# Patient Record
Sex: MALE | Race: WHITE | NOT HISPANIC OR LATINO | Employment: FULL TIME | URBAN - METROPOLITAN AREA
[De-identification: names, ages, dates, MRNs, and addresses within clinical notes are randomized per-mention and may not be internally consistent; named-entity substitution may affect disease eponyms.]

---

## 2024-05-19 ENCOUNTER — APPOINTMENT (OUTPATIENT)
Dept: CT IMAGING | Facility: CLINIC | Age: 54
End: 2024-05-19
Attending: EMERGENCY MEDICINE
Payer: COMMERCIAL

## 2024-05-19 ENCOUNTER — HOSPITAL ENCOUNTER (EMERGENCY)
Facility: CLINIC | Age: 54
Discharge: HOME | End: 2024-05-19
Attending: EMERGENCY MEDICINE | Admitting: EMERGENCY MEDICINE
Payer: COMMERCIAL

## 2024-05-19 VITALS
RESPIRATION RATE: 20 BRPM | DIASTOLIC BLOOD PRESSURE: 98 MMHG | SYSTOLIC BLOOD PRESSURE: 137 MMHG | HEART RATE: 92 BPM | WEIGHT: 225 LBS | OXYGEN SATURATION: 98 % | TEMPERATURE: 97.9 F

## 2024-05-19 DIAGNOSIS — N20.0 KIDNEY STONE: ICD-10-CM

## 2024-05-19 LAB
ALBUMIN SERPL BCG-MCNC: 4.4 G/DL (ref 3.5–5.2)
ALBUMIN UR-MCNC: NEGATIVE MG/DL
ALP SERPL-CCNC: 75 U/L (ref 40–150)
ALT SERPL W P-5'-P-CCNC: 14 U/L (ref 0–70)
ANION GAP SERPL CALCULATED.3IONS-SCNC: 15 MMOL/L (ref 7–15)
APPEARANCE UR: CLEAR
AST SERPL W P-5'-P-CCNC: 22 U/L (ref 0–45)
BASOPHILS # BLD AUTO: 0.1 10E3/UL (ref 0–0.2)
BASOPHILS NFR BLD AUTO: 0 %
BILIRUB DIRECT SERPL-MCNC: <0.2 MG/DL (ref 0–0.3)
BILIRUB SERPL-MCNC: 0.3 MG/DL
BILIRUB UR QL STRIP: NEGATIVE
BUN SERPL-MCNC: 15.2 MG/DL (ref 6–20)
CALCIUM SERPL-MCNC: 9.4 MG/DL (ref 8.6–10)
CHLORIDE SERPL-SCNC: 100 MMOL/L (ref 98–107)
COLOR UR AUTO: ABNORMAL
CREAT SERPL-MCNC: 0.92 MG/DL (ref 0.67–1.17)
DEPRECATED HCO3 PLAS-SCNC: 23 MMOL/L (ref 22–29)
EGFRCR SERPLBLD CKD-EPI 2021: >90 ML/MIN/1.73M2
EOSINOPHIL # BLD AUTO: 0.2 10E3/UL (ref 0–0.7)
EOSINOPHIL NFR BLD AUTO: 1 %
ERYTHROCYTE [DISTWIDTH] IN BLOOD BY AUTOMATED COUNT: 11.9 % (ref 10–15)
GLUCOSE SERPL-MCNC: 167 MG/DL (ref 70–99)
GLUCOSE UR STRIP-MCNC: NEGATIVE MG/DL
HCT VFR BLD AUTO: 47.2 % (ref 40–53)
HGB BLD-MCNC: 15.5 G/DL (ref 13.3–17.7)
HGB UR QL STRIP: ABNORMAL
HOLD SPECIMEN: NORMAL
HOLD SPECIMEN: NORMAL
HYALINE CASTS: 1 /LPF
IMM GRANULOCYTES # BLD: 0.1 10E3/UL
IMM GRANULOCYTES NFR BLD: 1 %
KETONES UR STRIP-MCNC: 40 MG/DL
LEUKOCYTE ESTERASE UR QL STRIP: NEGATIVE
LIPASE SERPL-CCNC: 24 U/L (ref 13–60)
LYMPHOCYTES # BLD AUTO: 2.9 10E3/UL (ref 0.8–5.3)
LYMPHOCYTES NFR BLD AUTO: 14 %
MCH RBC QN AUTO: 30.9 PG (ref 26.5–33)
MCHC RBC AUTO-ENTMCNC: 32.8 G/DL (ref 31.5–36.5)
MCV RBC AUTO: 94 FL (ref 78–100)
MONOCYTES # BLD AUTO: 1 10E3/UL (ref 0–1.3)
MONOCYTES NFR BLD AUTO: 5 %
MUCOUS THREADS #/AREA URNS LPF: PRESENT /LPF
NEUTROPHILS # BLD AUTO: 15.9 10E3/UL (ref 1.6–8.3)
NEUTROPHILS NFR BLD AUTO: 79 %
NITRATE UR QL: NEGATIVE
NRBC # BLD AUTO: 0 10E3/UL
NRBC BLD AUTO-RTO: 0 /100
PH UR STRIP: 5.5 [PH] (ref 5–7)
PLATELET # BLD AUTO: 348 10E3/UL (ref 150–450)
POTASSIUM SERPL-SCNC: 4 MMOL/L (ref 3.4–5.3)
PROT SERPL-MCNC: 7.9 G/DL (ref 6.4–8.3)
RBC # BLD AUTO: 5.02 10E6/UL (ref 4.4–5.9)
RBC URINE: 16 /HPF
SODIUM SERPL-SCNC: 138 MMOL/L (ref 135–145)
SP GR UR STRIP: 1.02 (ref 1–1.03)
UROBILINOGEN UR STRIP-MCNC: NORMAL MG/DL
WBC # BLD AUTO: 20.1 10E3/UL (ref 4–11)
WBC URINE: 1 /HPF

## 2024-05-19 PROCEDURE — 36415 COLL VENOUS BLD VENIPUNCTURE: CPT | Performed by: EMERGENCY MEDICINE

## 2024-05-19 PROCEDURE — 82248 BILIRUBIN DIRECT: CPT | Performed by: EMERGENCY MEDICINE

## 2024-05-19 PROCEDURE — 81001 URINALYSIS AUTO W/SCOPE: CPT | Performed by: EMERGENCY MEDICINE

## 2024-05-19 PROCEDURE — 74176 CT ABD & PELVIS W/O CONTRAST: CPT

## 2024-05-19 PROCEDURE — 96361 HYDRATE IV INFUSION ADD-ON: CPT

## 2024-05-19 PROCEDURE — 85025 COMPLETE CBC W/AUTO DIFF WBC: CPT | Performed by: EMERGENCY MEDICINE

## 2024-05-19 PROCEDURE — 258N000003 HC RX IP 258 OP 636: Performed by: EMERGENCY MEDICINE

## 2024-05-19 PROCEDURE — 96374 THER/PROPH/DIAG INJ IV PUSH: CPT

## 2024-05-19 PROCEDURE — 250N000011 HC RX IP 250 OP 636: Performed by: EMERGENCY MEDICINE

## 2024-05-19 PROCEDURE — 83690 ASSAY OF LIPASE: CPT | Performed by: EMERGENCY MEDICINE

## 2024-05-19 PROCEDURE — 80048 BASIC METABOLIC PNL TOTAL CA: CPT | Performed by: EMERGENCY MEDICINE

## 2024-05-19 PROCEDURE — 80053 COMPREHEN METABOLIC PANEL: CPT | Performed by: EMERGENCY MEDICINE

## 2024-05-19 PROCEDURE — 99285 EMERGENCY DEPT VISIT HI MDM: CPT | Mod: 25

## 2024-05-19 RX ORDER — KETOROLAC TROMETHAMINE 15 MG/ML
15 INJECTION, SOLUTION INTRAMUSCULAR; INTRAVENOUS ONCE
Status: COMPLETED | OUTPATIENT
Start: 2024-05-19 | End: 2024-05-19

## 2024-05-19 RX ORDER — KETOROLAC TROMETHAMINE 15 MG/ML
15 INJECTION, SOLUTION INTRAMUSCULAR; INTRAVENOUS ONCE
Status: DISCONTINUED | OUTPATIENT
Start: 2024-05-19 | End: 2024-05-19

## 2024-05-19 RX ORDER — ONDANSETRON 4 MG/1
4 TABLET, ORALLY DISINTEGRATING ORAL EVERY 8 HOURS PRN
Qty: 6 TABLET | Refills: 0 | Status: SHIPPED | OUTPATIENT
Start: 2024-05-19 | End: 2024-05-22

## 2024-05-19 RX ORDER — NAPROXEN 500 MG/1
500 TABLET ORAL 2 TIMES DAILY WITH MEALS
Qty: 8 TABLET | Refills: 0 | Status: SHIPPED | OUTPATIENT
Start: 2024-05-19 | End: 2024-05-23

## 2024-05-19 RX ADMIN — SODIUM CHLORIDE 1000 ML: 9 INJECTION, SOLUTION INTRAVENOUS at 11:25

## 2024-05-19 RX ADMIN — KETOROLAC TROMETHAMINE 15 MG: 15 INJECTION, SOLUTION INTRAMUSCULAR; INTRAVENOUS at 11:26

## 2024-05-19 ASSESSMENT — COLUMBIA-SUICIDE SEVERITY RATING SCALE - C-SSRS
6. HAVE YOU EVER DONE ANYTHING, STARTED TO DO ANYTHING, OR PREPARED TO DO ANYTHING TO END YOUR LIFE?: NO
2. HAVE YOU ACTUALLY HAD ANY THOUGHTS OF KILLING YOURSELF IN THE PAST MONTH?: NO
1. IN THE PAST MONTH, HAVE YOU WISHED YOU WERE DEAD OR WISHED YOU COULD GO TO SLEEP AND NOT WAKE UP?: NO

## 2024-05-19 ASSESSMENT — ACTIVITIES OF DAILY LIVING (ADL)
ADLS_ACUITY_SCORE: 35
ADLS_ACUITY_SCORE: 35

## 2024-05-19 NOTE — ED PROVIDER NOTES
Emergency Department Note      History of Present Illness     Chief Complaint  Flank Pain    HPI  Daren Jensen is a 53 year old male who presents to the emergency department with his wife for evaluation of flank pain. He reports that this morning he went [place] the golfing range, and within 15 minutes of returning home, he noticed pain in his lower back that started at about 0900 this morning, and continued to get worse. He took Tylenol at 0900 without relief.He also notes that he does go golfing quite regularly, and that it doesn't feel like a typical muscular pain that he has felt before. This is also the first time he has felt pain like this. He also is experiencing some nausea and states that he vomited earlier this morning at Urgent Care. He denies having any hematuria or burning with urination. He has no previous abdominal surgeries. He does note that he has a sister that has a history of kidney stones, but that he does not have any history of kidney stones. He also notes that he has Gout, in which he takes Prednisone for flare ups, and this usually relieves these symptoms.     Independent Historian  None    Review of External Notes  None  Past Medical History   Medical History and Problem List  Gout    Medications  Prednisone    Surgical History   Denies any past surgical history.    Physical Exam   Patient Vitals for the past 24 hrs:   BP Temp Temp src Pulse Resp SpO2 Weight   05/19/24 1327 (!) 137/98 -- -- 92 -- -- --   05/19/24 1326 (!) 137/98 -- -- 92 -- -- --   05/19/24 1241 (!) 143/94 -- -- 94 -- -- --   05/19/24 1156 110/67 -- -- -- -- 98 % --   05/19/24 1141 (!) 145/96 -- -- -- -- 96 % --   05/19/24 1126 (!) 148/94 -- -- -- -- 94 % --   05/19/24 1121 -- -- -- -- -- 98 % --   05/19/24 1106 (!) 153/92 -- -- -- -- -- --   05/19/24 1102 (!) 160/117 97.9  F (36.6  C) Temporal 93 20 98 % 102.1 kg (225 lb)     Physical Exam  General: Appears uncomfortable.   Head: No obvious trauma to head.  Ears, Nose, Throat:   External ears normal.  Nose normal.  No pharyngeal erythema, swelling or exudate.  Midline uvula. Moist mucus membranes.  Eyes:  Conjunctivae clear.   Neck: Normal range of motion.  Neck supple.   CV: Regular rate and rhythm.  No murmurs.      Respiratory: Effort normal and breath sounds normal.  No wheezing or crackles.   Gastrointestinal: Soft.  No distension. There is no tenderness.  There is no rigidity, no rebound and no guarding. R CVA Tenderness.   Musculoskeletal: Normal range of motion.  Non tender extremities to palpations. No lower extremity edema  Neuro: Alert. Moving all extremities appropriately.  Normal speech.    Skin: Skin is warm and dry.  No rash noted.   Psych: Normal mood and affect. Behavior is normal.    Diagnostics   Lab Results   Labs Ordered and Resulted from Time of ED Arrival to Time of ED Departure   ROUTINE UA WITH MICROSCOPIC REFLEX TO CULTURE - Abnormal       Result Value    Color Urine Light Yellow      Appearance Urine Clear      Glucose Urine Negative      Bilirubin Urine Negative      Ketones Urine 40 (*)     Specific Gravity Urine 1.019      Blood Urine Large (*)     pH Urine 5.5      Protein Albumin Urine Negative      Urobilinogen Urine Normal      Nitrite Urine Negative      Leukocyte Esterase Urine Negative      Mucus Urine Present (*)     RBC Urine 16 (*)     WBC Urine 1      Hyaline Casts Urine 1     BASIC METABOLIC PANEL - Abnormal    Sodium 138      Potassium 4.0      Chloride 100      Carbon Dioxide (CO2) 23      Anion Gap 15      Urea Nitrogen 15.2      Creatinine 0.92      GFR Estimate >90      Calcium 9.4      Glucose 167 (*)    CBC WITH PLATELETS AND DIFFERENTIAL - Abnormal    WBC Count 20.1 (*)     RBC Count 5.02      Hemoglobin 15.5      Hematocrit 47.2      MCV 94      MCH 30.9      MCHC 32.8      RDW 11.9      Platelet Count 348      % Neutrophils 79      % Lymphocytes 14      % Monocytes 5      % Eosinophils 1      % Basophils 0      % Immature Granulocytes 1       NRBCs per 100 WBC 0      Absolute Neutrophils 15.9 (*)     Absolute Lymphocytes 2.9      Absolute Monocytes 1.0      Absolute Eosinophils 0.2      Absolute Basophils 0.1      Absolute Immature Granulocytes 0.1      Absolute NRBCs 0.0     HEPATIC FUNCTION PANEL - Normal    Protein Total 7.9      Albumin 4.4      Bilirubin Total 0.3      Alkaline Phosphatase 75      AST 22      ALT 14      Bilirubin Direct <0.20     LIPASE - Normal    Lipase 24       Imaging  Abd/pelvis CT no contrast - Stone Protocol   Final Result   IMPRESSION:    1.  2 mm stone is noted in the distal right ureter 1 cm from the bladder which is causing mild right hydroureter and hydronephrosis.   2.  2 mm stone noted within the left kidney. No left hydronephrosis.           Independent Interpretation  None    ED Course    Medications Administered  Medications   ketorolac (TORADOL) injection 15 mg (15 mg Intravenous $Given 5/19/24 1126)   sodium chloride 0.9% BOLUS 1,000 mL (0 mLs Intravenous Stopped 5/19/24 1326)     Procedures  None     Discussion of Management  None    Social Determinants of Health adding to complexity of care  None    ED Course  ED Course as of 05/19/24 1407   Sun May 19, 2024   1115 I obtained history and examined the patient as noted above.    1255 I rechecked the patient and updated. I discussed plan for discharge home.      Medical Decision Making / Diagnosis   CMS Diagnoses: None    MIPS     None    [facility]  Daren Jensen is a 53 year old male presenting with right flank pain. He has a leukocytosis. CMP and lipase are within normal limits. UA is positive for blood but not infection. CT reveals a 2mm stone in the distal right ureter. He is given IVF and toradol. Upon reevaluation, he is pain free. He is given a prescription for naproxen and zofran. He is instructed to follow up with urology. He is sent home with a strainer to collect the stone when it passes. Return precautions are given and he verbalizes understanding. He is  discharged home in stable condition with his wife.    Disposition  The patient was discharged.     ICD-10 Codes:    ICD-10-CM    1. Kidney stone  N20.0            Discharge Medications  Discharge Medication List as of 5/19/2024  1:42 PM        START taking these medications    Details   naproxen (NAPROSYN) 500 MG tablet Take 1 tablet (500 mg) by mouth 2 times daily (with meals) for 4 days, Disp-8 tablet, R-0, Local Print      ondansetron (ZOFRAN ODT) 4 MG ODT tab Take 1 tablet (4 mg) by mouth every 8 hours as needed for nausea, Disp-6 tablet, R-0, Local Print               Scribe Disclosure:  I, Mena Donald, am serving as a scribe at 11:51 AM on 5/19/2024 to document services personally performed by Freddy La MD based on my observations and the provider's statements to me.        Freddy La MD  05/19/24 1947

## 2024-07-08 ENCOUNTER — VIRTUAL VISIT (OUTPATIENT)
Dept: INTERNAL MEDICINE | Facility: CLINIC | Age: 54
End: 2024-07-08
Payer: COMMERCIAL

## 2024-07-08 DIAGNOSIS — M10.9 ACUTE GOUT INVOLVING TOE OF RIGHT FOOT, UNSPECIFIED CAUSE: Primary | ICD-10-CM

## 2024-07-08 PROCEDURE — 99203 OFFICE O/P NEW LOW 30 MIN: CPT | Mod: 95 | Performed by: PHYSICIAN ASSISTANT

## 2024-07-08 RX ORDER — PREDNISONE 20 MG/1
TABLET ORAL
Qty: 30 TABLET | Refills: 0 | Status: SHIPPED | OUTPATIENT
Start: 2024-07-08 | End: 2024-09-10

## 2024-07-08 NOTE — PROGRESS NOTES
Daren is a 53 year old who is being evaluated via a billable video visit.    How would you like to obtain your AVS? MyChart  If the video visit is dropped, the invitation should be resent by: Text to cell phone: [phone]  Will anyone else be joining your video visit? No      Assessment & Plan     Acute gout involving toe of right foot, unspecified cause  No response to NSAIDs. Will start prednisone taper. Has taken these before. Unable to ice the area due to pain from pressure of the ice. Advise follow-up if symptoms do not improve with prednisone.  - predniSONE (DELTASONE) 20 MG tablet; Take 3 tablets (60mg) by mouth daily for 5 days, then 2 tablets (40mg) daily for 5 days, then 1 tablet (20mg) daily for 5 days      Subjective   Daren is a 53 year old, presenting for the following health issues:  Arthritis and flare up gout         7/8/2024    12:05 PM   Additional Questions   Roomed by Zohreh     Arthritis    History of Present Illness       Reason for visit:  Gout flare    He eats 4 or more servings of fruits and vegetables daily.He consumes 0 sweetened beverage(s) daily.He exercises with enough effort to increase his heart rate 30 to 60 minutes per day.  He exercises with enough effort to increase his heart rate 4 days per week.   He is taking medications regularly.     Gout flare  Location: R big toe, now extending into foot and other toes of the R foot  Onset: Tues  Advil sometimes fends it off, but it did not work this time  Gets a severe flare like this every 2-4 years.  Has pain with any movement of the affected joints, as well as with light touch of affected areas. Has redness, warmth, swelling, and pain.  H/o steroid injection into knee    Has been on allopurinol in the past (600mg dose was not beneficial)  Was also started on colchicine in the past, but was unable to tolerate this due to GI side effects    Usually does well with prednisone taper    Recently diagnosed with a kidney stone  Brother also has  gout and sister has h/o kidney stones    Has been dealing with gout since his early- to mid-20s    History reviewed. No pertinent past medical history.        Review of Systems  Constitutional, HEENT, cardiovascular, pulmonary, gi and gu systems are negative, except as otherwise noted.      Objective           Vitals:  No vitals were obtained today due to virtual visit.    Physical Exam   GENERAL: alert and no distress  EYES: Eyes grossly normal to inspection.  No discharge or erythema, or obvious scleral/conjunctival abnormalities.  RESP: No audible wheeze, cough, or visible cyanosis.    MS: R foot: edema and erythema of 1st MTP joint, with extension of erythema across R forefoot. R 2nd toe is edematous and erythematous  SKIN: See MSK exam  NEURO: Cranial nerves grossly intact.  Mentation and speech appropriate for age.  PSYCH: Appropriate affect, tone, and pace of words          Video-Visit Details    Type of service:  Video Visit   Originating Location (pt. Location): Home    Distant Location (provider location):  On-site  Platform used for Video Visit: Regan  Signed Electronically by: Irma Morales PA-C

## 2024-07-27 ENCOUNTER — HEALTH MAINTENANCE LETTER (OUTPATIENT)
Age: 54
End: 2024-07-27

## 2024-09-10 ENCOUNTER — VIRTUAL VISIT (OUTPATIENT)
Dept: INTERNAL MEDICINE | Facility: CLINIC | Age: 54
End: 2024-09-10
Payer: COMMERCIAL

## 2024-09-10 DIAGNOSIS — M10.9 ACUTE GOUT OF LEFT KNEE, UNSPECIFIED CAUSE: ICD-10-CM

## 2024-09-10 PROCEDURE — 99213 OFFICE O/P EST LOW 20 MIN: CPT | Mod: 95 | Performed by: NURSE PRACTITIONER

## 2024-09-10 RX ORDER — PREDNISONE 20 MG/1
TABLET ORAL
Qty: 30 TABLET | Refills: 0 | Status: SHIPPED | OUTPATIENT
Start: 2024-09-10

## 2024-09-10 NOTE — PROGRESS NOTES
Daren is a 53 year old who is being evaluated via a billable video visit.          Assessment & Plan     Acute gout of left knee, unspecified cause  -previously unresponsive to NSAIDs, will start Prednisone taper which he's done well with before     - predniSONE (DELTASONE) 20 MG tablet; Take 3 tablets (60mg) by mouth daily for 5 days, then 2 tablets (40mg) daily for 5 days, then 1 tablet (20mg) daily for 5 days    Advised to schedule appointment for annual physical with out clinic.  Follow up otherwise if symptoms do not improve or worsen.        Subjective   Daren is a 53 year old, presenting for the following health issues:  Knee Pain (Gout)    History of Present Illness       Reason for visit:  Gout flare    He eats 4 or more servings of fruits and vegetables daily.He consumes 0 sweetened beverage(s) daily.He exercises with enough effort to increase his heart rate 30 to 60 minutes per day.  He exercises with enough effort to increase his heart rate 4 days per week.   He is taking medications regularly.     Hx of recurrent gout flares every few years, recently had one in July.  He reports he just changed diet to vegetarian which has seemed to trigger another flare.  Current pain started yesterday in left knee.  He's been on Allopurinol in the past along with colchicine and also multiple NSAIDs.  He reports they were not beneficial, he was not able to tolerate the colchicine due to GI side effects.  He previously has done well with prednisone taper and request that today.  He reports he has been dealing with problems with gout for over 30 years.  He has previously seen rheumatology and orthopedics, has previously done aspiration to prove gout and also also had steroid injections.                Objective         Vitals:  No vitals were obtained today due to virtual visit.    Physical Exam   GENERAL: alert and no distress  EYES: Eyes grossly normal to inspection.  No discharge or erythema, or obvious  scleral/conjunctival abnormalities.  RESP: No audible wheeze, cough, or visible cyanosis.    PSYCH: Appropriate affect, tone, and pace of words          Video-Visit Details    Type of service:  Video Visit   Originating Location (pt. Location): Home    Distant Location (provider location):  On-site  Platform used for Video Visit: Regan  Signed Electronically by: Stefania Motley CNP

## 2025-01-08 ENCOUNTER — VIRTUAL VISIT (OUTPATIENT)
Dept: FAMILY MEDICINE | Facility: CLINIC | Age: 55
End: 2025-01-08
Payer: COMMERCIAL

## 2025-01-08 DIAGNOSIS — J01.90 ACUTE SINUSITIS WITH SYMPTOMS > 10 DAYS: Primary | ICD-10-CM

## 2025-01-08 DIAGNOSIS — Z12.11 SCREEN FOR COLON CANCER: ICD-10-CM

## 2025-01-08 DIAGNOSIS — M10.9 ACUTE GOUT, UNSPECIFIED CAUSE, UNSPECIFIED SITE: ICD-10-CM

## 2025-01-08 PROCEDURE — 98006 SYNCH AUDIO-VIDEO EST MOD 30: CPT | Performed by: NURSE PRACTITIONER

## 2025-01-08 RX ORDER — PREDNISONE 20 MG/1
TABLET ORAL
Qty: 15 TABLET | Refills: 0 | Status: SHIPPED | OUTPATIENT
Start: 2025-01-08 | End: 2025-01-15

## 2025-01-08 NOTE — PROGRESS NOTES
Daren is a 54 year old who is being evaluated via a billable video visit.    How would you like to obtain your AVS? MyChart  If the video visit is dropped, the invitation should be resent by: Text to cell phone: [phone]  Will anyone else be joining your video visit? No      Assessment & Plan     Acute sinusitis with symptoms > 10 days  Persistent sinus symptoms-will treat with antibiotics.  Encourage probiotics while on antibiotics.   If may take several days of antibiotics before feeling better.   Encourage nasal spray such as NASONEX, NASOCORT OR FLONASE, and/or Neti pot or Neilmed sinus rinses with Distilled water only.  Be seen if symptoms are worsening.  Daren verbalizes understanding of plan of care and is in agreement.    - amoxicillin-clavulanate (AUGMENTIN) 875-125 MG tablet  Dispense: 14 tablet; Refill: 0    Acute gout, unspecified cause, unspecified site  Steroid burst has worked well in the past.  Be seen if worsening.  Daren verbalizes understanding of plan of care and is in agreement.  .  - predniSONE (DELTASONE) 20 MG tablet  Dispense: 15 tablet; Refill: 0    Screen for colon cancer    - Colonoscopy Screening  Referral      Return in about 2 weeks (around 1/22/2025) for if symptoms persist or worsening please be seen.     Subjective   Daren is a 54 year old, presenting for the following health issues:  Arthritis (Sinus infection)        1/8/2025     2:55 PM   Additional Questions   Roomed by dixon vazquez   Accompanied by self     History of Present Illness       Reason for visit:  Gout and sinus infection    He eats 2-3 servings of fruits and vegetables daily.He consumes 0 sweetened beverage(s) daily.He exercises with enough effort to increase his heart rate 20 to 29 minutes per day.  He exercises with enough effort to increase his heart rate 4 days per week.   He is taking medications regularly.     Acute Illness sinus infection  Acute illness concerns: 3 weeks influenza cleared up then came  ba  Onset/Duration:   Symptoms:  Fever: No  Chills/Sweats: No  Headache (location?): No  Sinus Pressure: No  Conjunctivitis:  YES  Ear Pain: no  Rhinorrhea: No  Congestion: No just in sinus [place]  Sore Throat: No  Cough: no  Wheeze: No  Decreased Appetite: No  Nausea: No  Vomiting: No  Diarrhea: No  Dysuria/Freq.: No  Dysuria or Hematuria: No  Fatigue/Achiness: No  Sick/Strep Exposure: No  Therapies tried and outcome: None  Gout/ Single Inflamed Joint  Onset/Duration: 2 days ago  Description:   Location: 2nd toe - right  Joint Swelling: YES ball of foot   Redness: YES  Pain: YES  Intensity: severe  Progression of Symptoms: worsening  Accompanying Signs & Symptoms:  Fevers: No  History:   Trauma to the area: No  Previous history of gout: YES    Recent illness: YES  Alcohol use: No  Diuretic use: No  Precipitating or alleviating factors: None  Therapies tried and outcome: none    Gout X 35 years confirmed on fluid aspiration of joint has seen rheum been on daily meds seen ortho steroid injections    Constitutional, HEENT, cardiovascular, pulmonary, GI, , musculoskeletal, neuro, skin, endocrine and psych systems are negative, except as otherwise noted in the HPI.         Objective           Vitals:  No vitals were obtained today due to virtual visit.    Physical Exam   GENERAL: alert and no distress, very pleasant  EYES: Eyes grossly normal to inspection.  No discharge or erythema, or obvious scleral/conjunctival abnormalities.  RESP: No audible wheeze, cough, or visible cyanosis.    SKIN: Visible skin clear. No significant rash, abnormal pigmentation or lesions.  NEURO: Cranial nerves grossly intact.  Mentation and speech appropriate for age.  PSYCH: Appropriate affect, tone, and pace of words    Video-Visit Details    Type of service:  Video Visit   Originating Location (pt. Location): Home  Distant Location (provider location):  On-site  Platform used for Video Visit: Regan       Signed Electronically by:  AUDRA Mason CNP

## 2025-06-10 ENCOUNTER — VIRTUAL VISIT (OUTPATIENT)
Dept: FAMILY MEDICINE | Facility: CLINIC | Age: 55
End: 2025-06-10
Payer: COMMERCIAL

## 2025-06-10 DIAGNOSIS — M10.9 ACUTE GOUT, UNSPECIFIED CAUSE, UNSPECIFIED SITE: Primary | ICD-10-CM

## 2025-06-10 PROCEDURE — 98006 SYNCH AUDIO-VIDEO EST MOD 30: CPT | Performed by: NURSE PRACTITIONER

## 2025-06-10 RX ORDER — PREDNISONE 20 MG/1
TABLET ORAL
Qty: 18 TABLET | Refills: 0 | Status: SHIPPED | OUTPATIENT
Start: 2025-06-10 | End: 2025-06-20

## 2025-06-10 NOTE — PATIENT INSTRUCTIONS
Based on our discussion, I have outlined the following instructions for you:      - You will be taking steroids for 10 days to help with your gout flare.  - Please schedule a follow-up appointment which will be a physical and lab tests to check all of your labs fasting and check your blood pressure.   - If your symptoms get worse while taking steroids, it might be due to something else like an infection so please be seen.  - Your prescription has been sent to [facility] in [place], so you can pick it up there.  - Look out for a message in [facility] to arrange your next visit.    Thank you again for your visit, and we look forward to supporting you in your journey to better health.

## 2025-06-10 NOTE — PROGRESS NOTES
Daren is a 54 year old who is being evaluated via a billable video visit.    How would you like to obtain your AVS? [employer]  If the video visit is dropped, the invitation should be resent by: Text to cell phone: [phone]  Will anyone else be joining your video visit? No      Assessment & Plan     Acute gout, unspecified cause, unspecified site    - predniSONE (DELTASONE) 20 MG tablet  Dispense: 18 tablet; Refill: 0  - PRIMARY CARE FOLLOW-UP SCHEDULING    Assessment & Plan  Acute gout, unspecified cause, unspecified site:  - Acute gout flare, currently affecting the left side. History of gout affecting various joints including shoulder, knees, and wrists. No indication of rheumatoid arthritis. Blood pressure was elevated during the last lab check in May 2024, possibly related to kidney stones.  - Prescribe a 10-day course of steroids. Set up a recheck appointment for physical and lab work to monitor blood sugar levels and rule out diabetes. If symptoms worsen on steroids, consider alternative diagnoses such as infection. Prescription sent to [facility] in [place]. Patient to receive a message in [employer] to set up a follow-up visit.      Return in about 4 weeks (around 7/8/2025) for Wellness exam fasting labs.     Subjective   Daren is a 54 year old, presenting for the following health issues:  Arthritis        6/10/2025     9:21 AM   Additional Questions   Roomed by Damian ALLEN   Accompanied by Self     Arthritis    History of Present Illness       Reason for visit:  Gout flare    He eats 4 or more servings of fruits and vegetables daily.He consumes 0 sweetened beverage(s) daily.He exercises with enough effort to increase his heart rate 20 to 29 minutes per day.  He exercises with enough effort to increase his heart rate 3 or less days per week.   He is taking medications regularly.       - Daren Roquejaden reports use of steroids for gout management   - Last received a steroid burst in January, with a dosage of 60 mg  for three days, 40 mg for two days, and 20 mg for two days, totaling seven days.  - Experiences gout flare-ups two to three times a year, with the most recent severe episode affecting the left hand, rendering it almost unusable.  - Has had gout in various joints, including shoulder, knees, and wrists.  - No recent fever or illness reported.  - Blood pressure was elevated during a previous episode but he also had a kidney stone.  - No history of rheumatoid arthritis.  - Avoids alcohol and red meat, and consumes cranberry juice to manage gout.    Gout/ Single Inflamed Joint  Onset/Duration:   Description:   Location: wrist and Hand - left  Joint Swelling: YES  Redness: YES  Pain: YES  Intensity: severe  Progression of Symptoms: worsening  Accompanying Signs & Symptoms:  Fevers: No  History:   Trauma to the area: No  Previous history of gout: YES  Recent illness: No  Alcohol use: No  Diuretic use: No  Precipitating or alleviating factors:   Therapies tried and outcome: none and anti-inflammatories    Review of Systems  Constitutional, neuro, ENT, endocrine, pulmonary, cardiac, gastrointestinal, genitourinary, musculoskeletal, integument and psychiatric systems are negative, except as otherwise noted.      Objective           Vitals:  No vitals were obtained today due to virtual visit.    Physical Exam   GENERAL: alert and no distress  EYES: Eyes grossly normal to inspection.  No discharge or erythema, or obvious scleral/conjunctival abnormalities.  RESP: No audible wheeze, cough, or visible cyanosis.    SKIN: Visible skin clear. No significant rash, abnormal pigmentation or lesions.  NEURO: Cranial nerves grossly intact.  Mentation and speech appropriate for age.  PSYCH: Appropriate affect, tone, and pace of words    Video-Visit Details    Type of service:  Video Visit   Originating Location (pt. Location): Home  Distant Location (provider location):  On-site  Platform used for Video Visit: [employer]  Electronically by: AUDRA Mason CNP

## 2025-07-08 SDOH — HEALTH STABILITY: PHYSICAL HEALTH: ON AVERAGE, HOW MANY MINUTES DO YOU ENGAGE IN EXERCISE AT THIS LEVEL?: 30 MIN

## 2025-07-08 SDOH — HEALTH STABILITY: PHYSICAL HEALTH: ON AVERAGE, HOW MANY DAYS PER WEEK DO YOU ENGAGE IN MODERATE TO STRENUOUS EXERCISE (LIKE A BRISK WALK)?: 3 DAYS

## 2025-07-08 ASSESSMENT — SOCIAL DETERMINANTS OF HEALTH (SDOH): HOW OFTEN DO YOU GET TOGETHER WITH FRIENDS OR RELATIVES?: ONCE A WEEK

## 2025-07-09 ENCOUNTER — OFFICE VISIT (OUTPATIENT)
Dept: FAMILY MEDICINE | Facility: CLINIC | Age: 55
End: 2025-07-09
Payer: COMMERCIAL

## 2025-07-09 VITALS
WEIGHT: 258.2 LBS | HEIGHT: 70 IN | BODY MASS INDEX: 36.97 KG/M2 | SYSTOLIC BLOOD PRESSURE: 138 MMHG | DIASTOLIC BLOOD PRESSURE: 82 MMHG | RESPIRATION RATE: 20 BRPM | HEART RATE: 103 BPM | OXYGEN SATURATION: 97 % | TEMPERATURE: 98.6 F

## 2025-07-09 DIAGNOSIS — B96.89 BACTERIAL SINUSITIS: ICD-10-CM

## 2025-07-09 DIAGNOSIS — M1A.0420 IDIOPATHIC CHRONIC GOUT OF LEFT HAND WITHOUT TOPHUS: ICD-10-CM

## 2025-07-09 DIAGNOSIS — Z00.00 ROUTINE GENERAL MEDICAL EXAMINATION AT A HEALTH CARE FACILITY: Primary | ICD-10-CM

## 2025-07-09 DIAGNOSIS — Z12.5 SCREENING FOR PROSTATE CANCER: ICD-10-CM

## 2025-07-09 DIAGNOSIS — Z13.220 LIPID SCREENING: ICD-10-CM

## 2025-07-09 DIAGNOSIS — Z12.11 COLON CANCER SCREENING: ICD-10-CM

## 2025-07-09 DIAGNOSIS — J32.9 BACTERIAL SINUSITIS: ICD-10-CM

## 2025-07-09 DIAGNOSIS — Z13.1 SCREENING FOR DIABETES MELLITUS: ICD-10-CM

## 2025-07-09 PROBLEM — M1A.9XX0 GOUT, CHRONIC: Status: ACTIVE | Noted: 2024-09-09

## 2025-07-09 PROBLEM — Z87.442 PERSONAL HISTORY OF KIDNEY STONES: Status: ACTIVE | Noted: 2025-07-09

## 2025-07-09 PROBLEM — N20.0 KIDNEY STONE: Status: ACTIVE | Noted: 2024-06-16

## 2025-07-09 PROBLEM — N20.0 KIDNEY STONE: Status: RESOLVED | Noted: 2024-06-16 | Resolved: 2025-07-09

## 2025-07-09 LAB
EST. AVERAGE GLUCOSE BLD GHB EST-MCNC: 154 MG/DL
HBA1C MFR BLD: 7 % (ref 0–5.6)

## 2025-07-09 PROCEDURE — 90750 HZV VACC RECOMBINANT IM: CPT

## 2025-07-09 PROCEDURE — 3079F DIAST BP 80-89 MM HG: CPT

## 2025-07-09 PROCEDURE — 99396 PREV VISIT EST AGE 40-64: CPT | Mod: 25

## 2025-07-09 PROCEDURE — 80053 COMPREHEN METABOLIC PANEL: CPT

## 2025-07-09 PROCEDURE — 90472 IMMUNIZATION ADMIN EACH ADD: CPT

## 2025-07-09 PROCEDURE — 99214 OFFICE O/P EST MOD 30 MIN: CPT | Mod: 25

## 2025-07-09 PROCEDURE — 3051F HG A1C>EQUAL 7.0%<8.0%: CPT

## 2025-07-09 PROCEDURE — 84550 ASSAY OF BLOOD/URIC ACID: CPT

## 2025-07-09 PROCEDURE — 83036 HEMOGLOBIN GLYCOSYLATED A1C: CPT

## 2025-07-09 PROCEDURE — 90677 PCV20 VACCINE IM: CPT

## 2025-07-09 PROCEDURE — 3075F SYST BP GE 130 - 139MM HG: CPT

## 2025-07-09 PROCEDURE — 36415 COLL VENOUS BLD VENIPUNCTURE: CPT

## 2025-07-09 PROCEDURE — G2211 COMPLEX E/M VISIT ADD ON: HCPCS

## 2025-07-09 PROCEDURE — 80061 LIPID PANEL: CPT

## 2025-07-09 PROCEDURE — G0103 PSA SCREENING: HCPCS

## 2025-07-09 PROCEDURE — 90471 IMMUNIZATION ADMIN: CPT

## 2025-07-09 PROCEDURE — 83721 ASSAY OF BLOOD LIPOPROTEIN: CPT | Mod: 59

## 2025-07-09 RX ORDER — PREDNISONE 20 MG/1
TABLET ORAL
Qty: 30 TABLET | Refills: 0 | Status: SHIPPED | OUTPATIENT
Start: 2025-07-09

## 2025-07-09 RX ORDER — PREDNISONE 20 MG/1
TABLET ORAL
Qty: 18 TABLET | Refills: 0 | Status: SHIPPED | OUTPATIENT
Start: 2025-07-09 | End: 2025-07-09

## 2025-07-09 ASSESSMENT — ANXIETY QUESTIONNAIRES
1. FEELING NERVOUS, ANXIOUS, OR ON EDGE: NOT AT ALL
IF YOU CHECKED OFF ANY PROBLEMS ON THIS QUESTIONNAIRE, HOW DIFFICULT HAVE THESE PROBLEMS MADE IT FOR YOU TO DO YOUR WORK, TAKE CARE OF THINGS AT HOME, OR GET ALONG WITH OTHER PEOPLE: NOT DIFFICULT AT ALL
GAD7 TOTAL SCORE: 0
5. BEING SO RESTLESS THAT IT IS HARD TO SIT STILL: NOT AT ALL
2. NOT BEING ABLE TO STOP OR CONTROL WORRYING: NOT AT ALL
6. BECOMING EASILY ANNOYED OR IRRITABLE: NOT AT ALL
3. WORRYING TOO MUCH ABOUT DIFFERENT THINGS: NOT AT ALL
GAD7 TOTAL SCORE: 0
7. FEELING AFRAID AS IF SOMETHING AWFUL MIGHT HAPPEN: NOT AT ALL

## 2025-07-09 ASSESSMENT — PATIENT HEALTH QUESTIONNAIRE - PHQ9
5. POOR APPETITE OR OVEREATING: NOT AT ALL
SUM OF ALL RESPONSES TO PHQ QUESTIONS 1-9: 0

## 2025-07-09 NOTE — PROGRESS NOTES
"Preventive Care Visit  [facility]  Africa Cottrell AUDRA CNP, Family Medicine  Jul 9, 2025      Assessment & Plan   Routine general medical examination at a health care facility:  Reviewed health maintenance/screening services guidelines/recommendations as well as vaccination recommendations as indicated which Daren understands. Services ordered after shared decision making agreed upon. Recommended healthy habits/diet and exercise.      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 History of gout in multiple joints, resistant to allopurinol and colchicine. Previous treatment with prednisone was effective. Uric acid today.  - Prescribe prednisone with a tapering dose over 15 days. Referral to rheumatologist for further evaluation and management.    Bacterial sinusitis:  - Recurrent sinus infections, possibly related to allergies.  - Augmentin BID x 7 days. Recommend using Zyrtec nightly and Flonase nasal spray twice daily. Referral to ENT if symptoms persist.    Lipid screening:  - Check cholesterol levels.    Screening for diabetes mellitus:  - Family history of diabetes.  - Check hemoglobin A1c levels.    Colon cancer screening:  - Referral for colonoscopy. Blood test to screen for colon cancer.    Screening for prostate cancer:  - Blood test to screen for prostate cancer.      Nicotine/Tobacco Cessation  He reports that he has never smoked. He has been exposed to tobacco smoke. He uses smokeless tobacco.  Nicotine/Tobacco Cessation Plan  Information offered: Patient not interested at this time      BMI  Estimated body mass index is 37.05 kg/m  as calculated from the following:    Height as of this encounter: 1.778 m (5' 10\").    Weight as of this encounter: 117.1 kg (258 lb 3.2 oz).   Weight management plan: Discussed healthy diet and exercise guidelines    Counseling  Appropriate preventive services were addressed with this patient via " screening, questionnaire, or discussion as appropriate for fall prevention, nutrition, physical activity, Tobacco-use cessation, social engagement, weight loss and cognition.  Checklist reviewing preventive services available has been given to the patient.  Reviewed patient's diet, addressing concerns and/or questions.   He is at risk for lack of exercise and has been provided with information to increase physical activity for the benefit of his well-being.   The patient was instructed to see the dentist every 6 months.   Reviewed preventive health counseling, as reflected in patient instructions    The longitudinal plan of care for the diagnosis(es)/condition(s) as documented were addressed during this visit. Due to the added complexity in care, I will continue to support Daren in the subsequent management and with ongoing continuity of care.    Subjective   Daren is a 54 year old, presenting for the following:  Physical        7/9/2025    12:36 PM   Additional Questions   Roomed by Saranya AKHTAR   Daren Jensen, a 54-year-old male, reports a sore wrist and hand due to gout, which he has experienced in every joint. He has no fever, chills, or body aches associated with this episode. Previously, he has been treated with prednisone, indomethacin, allopurinol, and colchicine, but none have effectively managed his condition, leading him to discontinue their use. He experiences gout flares every few months, which are debilitating.    He also reports a recurrent sinus infection. He is unsure if allergies are contributing to the sinus issues. He occasionally experiences maxillary pressure headaches when the sinus infection is severe.    Daren has a family history of diabetes and is actively trying to lose weight by changing his diet. About a year ago, he quit red meat and now primarily consumes fish, turkey, vegetables, and fruits. Despite these efforts, he finds weight loss challenging as he ages.    He has a personal  history of kidney stones, occurring just over a year ago, and notes that his sister has had kidney stones and his brother suffers from gout, suggesting a genetic predisposition. He does not have a personal history of arthritis and has not consumed alcohol recently. He has no known allergies to medications.    He is physically active, engaging in activities like walking, biking, canoeing, and fishing.   Acute Illness  Acute illness concerns: sinus pain, congestion  Onset/Duration: 1 month   Symptoms:  Fever: No  Chills/Sweats: No  Headache (location?): No  Sinus Pressure: YES  Conjunctivitis:  No  Ear Pain: no  Rhinorrhea: YES  Congestion: YES  Sore Throat: No  Cough: no  Wheeze: No  Decreased Appetite: No  Nausea: No  Vomiting: No  Diarrhea: No  Dysuria/Freq.: No  Dysuria or Hematuria: No  Fatigue/Achiness: No  Sick/Strep Exposure: No  Therapies tried and outcome: was treated but sinus infection came back  Gout/ Single Inflamed Joint  Onset/Duration: 1 month ago   Description:   Location: left hand, left elbow  Joint Swelling: YES  Redness: No  Pain: YES  Intensity: moderate  Progression of Symptoms: worsening  Accompanying Signs & Symptoms:  Fevers: No  History:   Trauma to the area: No  Previous history of gout: YES  Recent illness: No  Alcohol use: No  Diuretic use: No  Precipitating or alleviating factors: None  Therapies tried and outcome: Advil     Advance Care Planning    Discussed advance care planning with patient; however, patient declined at this time.        7/8/2025   General Health   How would you rate your overall physical health? Good   Feel stress (tense, anxious, or unable to sleep) Only a little   (!) STRESS CONCERN      7/8/2025   Nutrition   Three or more servings of calcium each day? Yes   Diet: Carbohydrate counting   How many servings of fruit and vegetables per day? 4 or more   How many sweetened beverages each day? 0-1         7/8/2025   Exercise   Days per week of moderate/strenous  "exercise 3 days   Average minutes spent exercising at this level 30 min         7/8/2025   Social Factors   Frequency of gathering with friends or relatives Once a week   Worry food won't last until get money to buy more No   Food not last or not have enough money for food? No   Do you have housing? (Housing is defined as stable permanent housing and does not include staying outside in a car, in a tent, in an abandoned building, in an overnight shelter, or couch-surfing.) Yes   Are you worried about losing your housing? No   Lack of transportation? No   Unable to get utilities (heat,electricity)? No         7/8/2025   Fall Risk   Fallen 2 or more times in the past year? No   Trouble with walking or balance? No          7/8/2025   Dental   Dentist two times every year? (!) NO           Today's PHQ-2 Score:       7/9/2025    12:38 PM   PHQ-2 ( 1999 [employer])   Q1: Little interest or pleasure in doing things 0   Q2: Feeling down, depressed or hopeless 0   PHQ-2 Score 0         7/8/2025   Substance Use   Alcohol more than 3/day or more than 7/wk Not Applicable   Do you use any other substances recreationally? (!) CANNABIS PRODUCTS     Social History     Tobacco Use    Smoking status: Never     Passive exposure: Past    Smokeless tobacco: Current   Vaping Use    Vaping status: Never Used   Substance Use Topics    Alcohol use: Not Currently    Drug use: Yes     Types: Marijuana           7/8/2025   STI Screening   New sexual partner(s) since last STI/HIV test? No   Last PSA: No results found for: \"PSA\"  ASCVD Risk   The ASCVD Risk score (Kermit MOLINA, et al., 2019) failed to calculate for the following reasons:    Cannot find a previous HDL lab    Cannot find a previous total cholesterol lab         Reviewed and updated as needed this visit by Provider   Tobacco  Allergies  Meds  Problems  Med Hx  Surg Hx  Fam Hx               Objective    Exam  /82 (BP Location: Right arm, Patient Position: Sitting, Cuff " "Size: Adult Large)   Pulse 103   Temp 98.6  F (37  C) (Oral)   Resp 20   Ht 1.778 m (5' 10\")   Wt 117.1 kg (258 lb 3.2 oz)   SpO2 97%   BMI 37.05 kg/m     Estimated body mass index is 37.05 kg/m  as calculated from the following:    Height as of this encounter: 1.778 m (5' 10\").    Weight as of this encounter: 117.1 kg (258 lb 3.2 oz).    Physical Exam  GENERAL: alert and no distress  EYES: Eyes grossly normal to inspection, PERRL and conjunctivae and sclerae normal  HENT: ear canals and TM's normal, nose and mouth without ulcers or lesions  NECK: no adenopathy, no asymmetry, masses, or scars  RESP: lungs clear to auscultation - no rales, rhonchi or wheezes  CV: regular rate and rhythm, normal S1 S2, no S3 or S4, no murmur, click or rub, no peripheral edema  ABDOMEN: soft, nontender, no hepatosplenomegaly, no masses and bowel sounds normal  MS: LUE exam shows: swollen left wrist/hand  SKIN: no suspicious lesions or rashes  NEURO: Normal strength and tone, mentation intact and speech normal  PSYCH: mentation appears normal, affect normal/bright        Signed Electronically by: AUDRA Leach CNP    "

## 2025-07-09 NOTE — PATIENT INSTRUCTIONS
Zyrtec nightly and Flonase     Patient Education   Preventive Care Advice   This is general advice given by our system to help you stay healthy. However, your care team may have specific advice just for you. Please talk to your care team about your preventive care needs.  Nutrition  Eat 5 or more servings of fruits and vegetables each day.  Try wheat bread, brown rice and whole grain pasta (instead of white bread, rice, and pasta).  Get enough calcium and vitamin D. Check the label on foods and aim for 100% of the RDA (recommended daily allowance).  Lifestyle  Exercise at least 150 minutes each week  (30 minutes a day, 5 days a week).  Do muscle strengthening activities 2 days a week. These help control your weight and prevent disease.  No smoking.  Wear sunscreen to prevent skin cancer.  Have a dental exam and cleaning every 6 months.  Yearly exams  See your health care team every year to talk about:  Any changes in your health.  Any medicines your care team has prescribed.  Preventive care, family planning, and ways to prevent chronic diseases.  Shots (vaccines)   HPV shots (up to age 26), if you've never had them before.  Hepatitis B shots (up to age 59), if you've never had them before.  COVID-19 shot: Get this shot when it's due.  Flu shot: Get a flu shot every year.  Tetanus shot: Get a tetanus shot every 10 years.  Pneumococcal, hepatitis A, and RSV shots: Ask your care team if you need these based on your risk.  Shingles shot (for age 50 and up)  General health tests  Diabetes screening:  Starting at age 35, Get screened for diabetes at least every 3 years.  If you are younger than age 35, ask your care team if you should be screened for diabetes.  Cholesterol test: At age 39, start having a cholesterol test every 5 years, or more often if advised.  Bone density scan (DEXA): At age 50, ask your care team if you should have this scan for osteoporosis (brittle bones).  Hepatitis C: Get tested at least once in  your life.  STIs (sexually transmitted infections)  Before age 24: Ask your care team if you should be screened for STIs.  After age 24: Get screened for STIs if you're at risk. You are at risk for STIs (including HIV) if:  You are sexually active with more than one person.  You don't use condoms every time.  You or a partner was diagnosed with a sexually transmitted infection.  If you are at risk for HIV, ask about PrEP medicine to prevent HIV.  Get tested for HIV at least once in your life, whether you are at risk for HIV or not.  Cancer screening tests  Cervical cancer screening: If you have a cervix, begin getting regular cervical cancer screening tests starting at age 21.  Breast cancer scan (mammogram): If you've ever had breasts, begin having regular mammograms starting at age 40. This is a scan to check for breast cancer.  Colon cancer screening: It is important to start screening for colon cancer at age 45.  Have a colonoscopy test every 10 years (or more often if you're at risk) Or, ask your provider about stool tests like a FIT test every year or Cologuard test every 3 years.  To learn more about your testing options, visit:   .  For help making a decision, visit:   https://bit.ly/fm68334.  Prostate cancer screening test: If you have a prostate, ask your care team if a prostate cancer screening test (PSA) at age 55 is right for you.  Lung cancer screening: If you are a current or former smoker ages 50 to 80, ask your care team if ongoing lung cancer screenings are right for you.  For informational purposes only. Not to replace the advice of your health care provider. Copyright   2023 [facility]. All rights reserved. Clinically reviewed by the [facility] Transitions Program. [employer] 813952 - REV 01/24.  Substance Use Disorder: Care Instructions  Overview     You can improve your life and health by stopping your use of alcohol or drugs. When you don't drink or use drugs, you may feel  and sleep better. You may get along better with your family, friends, and coworkers. There are medicines and programs that can help with substance use disorder.  How can you care for yourself at home?  Here are some ways to help you stay sober and prevent relapse.  If you have been given medicine to help keep you sober or reduce your cravings, be sure to take it exactly as prescribed.  Talk to your doctor about programs that can help you stop using drugs or drinking alcohol.  Do not keep alcohol or drugs in your home.  Plan ahead. Think about what you'll say if other people ask you to drink or use drugs. Try not to spend time with people who drink or use drugs.  Use the time and money spent on drinking or drugs to do something that's important to you.  Preventing a relapse  Have a plan to deal with relapse. Learn to recognize changes in your thinking that lead you to drink or use drugs. Get help before you start to drink or use drugs again.  Try to stay away from situations, friends, or places that may lead you to drink or use drugs.  If you feel the need to drink alcohol or use drugs again, seek help right away. Call a trusted friend or family member. Some people get support from organizations such as Narcotics Anonymous or [employer] or from treatment facilities.  If you relapse, get help as soon as you can. Some people make a plan with another person that outlines what they want that person to do for them if they relapse. The plan usually includes how to handle the relapse and who to notify in case of relapse.  Don't give up. Remember that a relapse doesn't mean that you have failed. Use the experience to learn the triggers that lead you to drink or use drugs. Then quit again. Recovery is a lifelong process. Many people have several relapses before they are able to quit for good.  Follow-up care is a key part of your treatment and safety. Be sure to make and go to all appointments, and call your doctor if you  "are having problems. It's also a good idea to know your test results and keep a list of the medicines you take.  When should you call for help?   Call 911  anytime you think you may need emergency care. For example, call if you or someone else:    Has overdosed or has withdrawal signs. Be sure to tell the emergency workers that you are or someone else is using or trying to quit using drugs. Overdose or withdrawal signs may include:  Losing consciousness.  Seizure.  Seeing or hearing things that aren't there (hallucinations).     Is thinking or talking about suicide or harming others.   Where to get help 24 hours a day, 7 days a week   If you or someone you know talks about suicide, self-harm, a mental health crisis, a substance use crisis, or any other kind of emotional distress, get help right away. You can:    Call the Suicide and Crisis Lifeline at 988.     Call 8-561-087-TALK (1-158.489.9351).     Text HOME to 411873 to access the Crisis Text Line.   Consider saving these numbers in your phone.  Go to [employer] for more information or to chat online.  Call your doctor now or seek immediate medical care if:    You are having withdrawal symptoms. These may include nausea or vomiting, sweating, shakiness, and anxiety.   Watch closely for changes in your health, and be sure to contact your doctor if:    You have a relapse.     You need more help or support to stop.   Where can you learn more?  Go to https://www.[place].net/patiented  Enter H573 in the search box to learn more about \"Substance Use Disorder: Care Instructions.\"  Current as of: August 20, 2024  Content Version: 14.5    1122-9102 [employer].   Care instructions adapted under license by your healthcare professional. If you have questions about a medical condition or this instruction, always ask your healthcare professional. [employer] disclaims any warranty or liability for your use of this information.       "

## 2025-07-10 ENCOUNTER — RESULTS FOLLOW-UP (OUTPATIENT)
Dept: FAMILY MEDICINE | Facility: CLINIC | Age: 55
End: 2025-07-10
Payer: COMMERCIAL

## 2025-07-10 ENCOUNTER — PATIENT OUTREACH (OUTPATIENT)
Dept: CARE COORDINATION | Facility: CLINIC | Age: 55
End: 2025-07-10
Payer: COMMERCIAL

## 2025-07-10 LAB
ALBUMIN SERPL BCG-MCNC: 4 G/DL (ref 3.5–5.2)
ALP SERPL-CCNC: 82 U/L (ref 40–150)
ALT SERPL W P-5'-P-CCNC: 18 U/L (ref 0–70)
ANION GAP SERPL CALCULATED.3IONS-SCNC: 12 MMOL/L (ref 7–15)
AST SERPL W P-5'-P-CCNC: 22 U/L (ref 0–45)
BILIRUB SERPL-MCNC: 0.2 MG/DL
BUN SERPL-MCNC: 11.3 MG/DL (ref 6–20)
CALCIUM SERPL-MCNC: 9.3 MG/DL (ref 8.8–10.4)
CHLORIDE SERPL-SCNC: 102 MMOL/L (ref 98–107)
CHOLEST SERPL-MCNC: 227 MG/DL
CREAT SERPL-MCNC: 0.83 MG/DL (ref 0.67–1.17)
EGFRCR SERPLBLD CKD-EPI 2021: >90 ML/MIN/1.73M2
FASTING STATUS PATIENT QL REPORTED: ABNORMAL
FASTING STATUS PATIENT QL REPORTED: ABNORMAL
GLUCOSE SERPL-MCNC: 105 MG/DL (ref 70–99)
HCO3 SERPL-SCNC: 25 MMOL/L (ref 22–29)
HDLC SERPL-MCNC: 32 MG/DL
LDLC SERPL CALC-MCNC: ABNORMAL MG/DL
LDLC SERPL DIRECT ASSAY-MCNC: 122 MG/DL
NONHDLC SERPL-MCNC: 195 MG/DL
POTASSIUM SERPL-SCNC: 4.7 MMOL/L (ref 3.4–5.3)
PROT SERPL-MCNC: 7.4 G/DL (ref 6.4–8.3)
PSA SERPL DL<=0.01 NG/ML-MCNC: 0.92 NG/ML (ref 0–3.5)
SODIUM SERPL-SCNC: 139 MMOL/L (ref 135–145)
TRIGL SERPL-MCNC: 522 MG/DL
URATE SERPL-MCNC: 7.5 MG/DL (ref 3.4–7)

## 2025-07-10 NOTE — TELEPHONE ENCOUNTER
"Left message asking patient to return the call regarding result note from Africa Cottrell, CNP:    \"... let him know that his recent labs show that he is diabetic with elevated cholesterol levels. I recommend starting medication for both his cholesterol and diabetes and would like him to schedule a follow-up appointment as soon as possible to get medication started. His uric acid level was 7.5, which indicates that it is elevated as expected. We like uric acid levels to be under 6. \"  "

## 2025-07-10 NOTE — LETTER
July 15, 2025      Daren Jensen  [address]  [place] [address]        Dear Daren,     Please see the message below from AUDRA Leach CNP:    Your recent labs show that you are diabetic with elevated cholesterol levels. I recommend starting medication for both your cholesterol and diabetes and would like you to schedule a follow-up appointment visit as soon as possible to get medication started. Your uric acid level was 7.5, which indicates that it is elevated as expected. We like uric acid levels to be under 6.       Please give us a call to get a follow up appointment scheduled as soon as possible.         Sincerely,    AUDRA Leach CNP / Avril FUNG RN

## 2025-07-11 NOTE — TELEPHONE ENCOUNTER
RN attempt #2, message left to return call     Attempted to reach patient to: Discuss test results    Results,     Please review message below from Africa Cottrell APRN CNP     let him know that his recent labs show that he is diabetic with elevated cholesterol levels. I recommend starting medication for both his cholesterol and diabetes and would like him to schedule a follow-up appointment as soon as possible to get medication started. His uric acid level was 7.5, which indicates that it is elevated as expected. We like uric acid levels to be under 6    When patient returns call, please take this action: Transfer to site RN green line (or update telephone encounter in after-hours)    Kate Johnson Registered Nurse  [facility]

## 2025-07-14 ENCOUNTER — PATIENT OUTREACH (OUTPATIENT)
Dept: CARE COORDINATION | Facility: CLINIC | Age: 55
End: 2025-07-14
Payer: COMMERCIAL

## 2025-07-14 NOTE — TELEPHONE ENCOUNTER
RN attempt #3, message left to return call     My chart sent     Review on 7/15/2025 and if no response from patient send letter as 3 attempts have been made     Attempted to reach patient to: Relay a message    Regarding: results     let him know that his recent labs show that he is diabetic with elevated cholesterol levels. I recommend starting medication for both his cholesterol and diabetes and would like him to schedule a follow-up appointment as soon as possible to get medication started. His uric acid level was 7.5, which indicates that it is elevated as expected. We like uric acid levels to be under 6     Action to take: Transfer to site RN green line (or update telephone encounter in after-hours)    Kate Johnson Registered Nurse  [facility]

## 2025-07-15 NOTE — TELEPHONE ENCOUNTER
Letter created, printed, and mailed.     Avril FUNG RN  [facility]

## 2025-07-28 ENCOUNTER — OFFICE VISIT (OUTPATIENT)
Dept: FAMILY MEDICINE | Facility: CLINIC | Age: 55
End: 2025-07-28
Payer: COMMERCIAL

## 2025-07-28 VITALS
HEART RATE: 100 BPM | HEIGHT: 70 IN | TEMPERATURE: 99 F | DIASTOLIC BLOOD PRESSURE: 76 MMHG | WEIGHT: 255.2 LBS | SYSTOLIC BLOOD PRESSURE: 111 MMHG | OXYGEN SATURATION: 97 % | BODY MASS INDEX: 36.54 KG/M2

## 2025-07-28 DIAGNOSIS — E11.620 TYPE 2 DIABETES MELLITUS WITH DIABETIC DERMATITIS, WITHOUT LONG-TERM CURRENT USE OF INSULIN (H): Primary | ICD-10-CM

## 2025-07-28 DIAGNOSIS — E66.01 CLASS 2 SEVERE OBESITY WITH BODY MASS INDEX (BMI) OF 35 TO 39.9 WITH SERIOUS COMORBIDITY (H): ICD-10-CM

## 2025-07-28 DIAGNOSIS — E78.5 HYPERLIPIDEMIA LDL GOAL <70: ICD-10-CM

## 2025-07-28 DIAGNOSIS — E66.812 CLASS 2 SEVERE OBESITY WITH BODY MASS INDEX (BMI) OF 35 TO 39.9 WITH SERIOUS COMORBIDITY (H): ICD-10-CM

## 2025-07-28 PROCEDURE — 3074F SYST BP LT 130 MM HG: CPT

## 2025-07-28 PROCEDURE — G2211 COMPLEX E/M VISIT ADD ON: HCPCS

## 2025-07-28 PROCEDURE — 1126F AMNT PAIN NOTED NONE PRSNT: CPT

## 2025-07-28 PROCEDURE — 3078F DIAST BP <80 MM HG: CPT

## 2025-07-28 PROCEDURE — 99214 OFFICE O/P EST MOD 30 MIN: CPT

## 2025-07-28 RX ORDER — LANCETS
EACH MISCELLANEOUS
Qty: 100 EACH | Refills: 6 | Status: SHIPPED | OUTPATIENT
Start: 2025-07-28

## 2025-07-28 RX ORDER — ROSUVASTATIN CALCIUM 10 MG/1
10 TABLET, COATED ORAL DAILY
Qty: 90 TABLET | Refills: 0 | Status: SHIPPED | OUTPATIENT
Start: 2025-07-28

## 2025-07-28 ASSESSMENT — PAIN SCALES - GENERAL: PAINLEVEL_OUTOF10: NO PAIN (0)

## 2025-07-28 NOTE — PROGRESS NOTES
Assessment & Plan     Type 2 diabetes mellitus with diabetic dermatitis:  - Newly diagnosed medical condition with the most recent A1c at 7.0.  - Start Ozempic, pending insurance approval.  Referral to a diabetic educator for lifestyle and dietary guidance. Order a blood glucose monitor for twice-daily checks. Schedule a follow-up in three months to evaluate A1c and medication effectiveness.  - Patient does not have any absolute contraindications including a personal/family history of medullary thyroid cancer or MENS II or a personal history of pancreatitis. Discussed medication in detail including risks/benefits and possible side effects.     Class 2 severe obesity:  - Chronic medical condition.   - Ozempic may aid in weight loss. Referral to a diabetic educator for dietary advice.  - Recommend continuing to work on therapeutic lifestyle changes including a heart healthy diet, regular physical activity and weight management/loss efforts.      Hyperlipidemia:  - Newly diagnosed medical condition. Patient agreeable to start rosuvastatin. Recheck labs in 3 months.  - Discussed medication in detail including risks/benefits and possible side effects.     The longitudinal plan of care for the diagnosis(es)/condition(s) as documented were addressed during this visit. Due to the added complexity in care, I will continue to support Daren in the subsequent management and with ongoing continuity of care.    Subjective   Daren is a 54 year old, presenting for the following health issues:  Results (Diabetes test results, cholesterol )    History of Present Illness       Diabetes:   He presents for follow up of diabetes.    He is not checking blood glucose.   Blood glucose is sometimes over 200 and      He has no concerns regarding his diabetes at this time.  He is having weight gain.  The patient has not had a diabetic eye exam in the last 12 months.          Hyperlipidemia:  He presents for follow up of hyperlipidemia.   He is  not taking medication to lower cholesterol. He is not having myalgia or other side effects to statin medications.    Reason for visit:  Labs show to high of cholesterol and showing diabetes a1c to high    He eats 4 or more servings of fruits and vegetables daily.He consumes 0 sweetened beverage(s) daily.He exercises with enough effort to increase his heart rate 30 to 60 minutes per day.  He exercises with enough effort to increase his heart rate 4 days per week.   He is taking medications regularly.   Daren Jensen, a 54-year-old male, has been diagnosed with diabetes. He is interested in using Ozempic for diabetes management and weight loss, noting that his sister had great results with it for weight loss after her cancer treatment. Daren reports a recent weight loss of 3 lbs and mentions that his gout is currently resolved.  Daren expresses concern about cholesterol and the associated cardiovascular risk due to diabetes. His wife and sister are teachers, and he works remotely at [employer].    Office Visit on 07/09/2025   Component Date Value Ref Range Status    Uric Acid 07/09/2025 7.5 (H)  3.4 - 7.0 mg/dL Final    Sodium 07/09/2025 139  135 - 145 mmol/L Final    Potassium 07/09/2025 4.7  3.4 - 5.3 mmol/L Final    Carbon Dioxide (CO2) 07/09/2025 25  22 - 29 mmol/L Final    Anion Gap 07/09/2025 12  7 - 15 mmol/L Final    Urea Nitrogen 07/09/2025 11.3  6.0 - 20.0 mg/dL Final    Creatinine 07/09/2025 0.83  0.67 - 1.17 mg/dL Final    GFR Estimate 07/09/2025 >90  >60 mL/min/1.73m2 Final    eGFR calculated using 2021 CKD-EPI equation.    Calcium 07/09/2025 9.3  8.8 - 10.4 mg/dL Final    Chloride 07/09/2025 102  98 - 107 mmol/L Final    Glucose 07/09/2025 105 (H)  70 - 99 mg/dL Final    Alkaline Phosphatase 07/09/2025 82  40 - 150 U/L Final    AST 07/09/2025 22  0 - 45 U/L Final    ALT 07/09/2025 18  0 - 70 U/L Final    Protein Total 07/09/2025 7.4  6.4 - 8.3 g/dL Final    Albumin 07/09/2025 4.0  3.5 - 5.2 g/dL Final     "Bilirubin Total 07/09/2025 0.2  <=1.2 mg/dL Final    Patient Fasting > 8hrs? 07/09/2025 Unknown   Final    Cholesterol 07/09/2025 227 (H)  <200 mg/dL Final    Triglycerides 07/09/2025 522 (H)  <150 mg/dL Final    Direct Measure HDL 07/09/2025 32 (L)  >=40 mg/dL Final    LDL Cholesterol Calculated 07/09/2025    Final    Cannot estimate LDL when triglyceride exceeds 400 mg/dL    Non HDL Cholesterol 07/09/2025 195 (H)  <130 mg/dL Final    Patient Fasting > 8hrs? 07/09/2025 Unknown   Final    Estimated Average Glucose 07/09/2025 154 (H)  <117 mg/dL Final    Hemoglobin A1C 07/09/2025 7.0 (H)  0.0 - 5.6 % Final    Normal <5.7%   Prediabetes 5.7-6.4%    Diabetes 6.5% or higher     Note: Adopted from ADA consensus guidelines.    Prostate Specific Antigen Screen 07/09/2025 0.92  0.00 - 3.50 ng/mL Final    LDL Cholesterol Direct 07/09/2025 122 (H)  <100 mg/dL Final    Age 2-19 years:  Desirable: < 110 mg/dL   Borderline High: 110-129 mg/dL   High: >= 130 mg/dL    Age 20 years and older:  Desirable: < 100 mg/dL  Above Desirable: 100-129 mg/dL   Borderline High: 130-159 mg/dL   High: 160-189 mg/dL   Very High: >= 190 mg/dL            NEW PATIENT-DIABETES QUESTIONNAIRE    Sometimes ankle flares up - talk about medication and test results going forward           Objective    /76 (BP Location: Right arm, Patient Position: Sitting, Cuff Size: Adult Regular)   Pulse 100   Temp 99  F (37.2  C) (Oral)   Ht 1.778 m (5' 10\")   Wt 115.8 kg (255 lb 3.2 oz)   SpO2 97%   BMI 36.62 kg/m    Body mass index is 36.62 kg/m .  Physical Exam   GENERAL: alert and no distress  RESP: lungs clear to auscultation - no rales, rhonchi or wheezes  CV: regular rate and rhythm, normal S1 S2, no S3 or S4, no murmur, click or rub, no peripheral edema            Signed Electronically by: AUDRA Leach CNP    "

## 2025-07-29 ENCOUNTER — PATIENT OUTREACH (OUTPATIENT)
Dept: CARE COORDINATION | Facility: CLINIC | Age: 55
End: 2025-07-29
Payer: COMMERCIAL

## 2025-07-31 ENCOUNTER — PATIENT OUTREACH (OUTPATIENT)
Dept: CARE COORDINATION | Facility: CLINIC | Age: 55
End: 2025-07-31
Payer: COMMERCIAL